# Patient Record
Sex: FEMALE | Race: BLACK OR AFRICAN AMERICAN | URBAN - METROPOLITAN AREA
[De-identification: names, ages, dates, MRNs, and addresses within clinical notes are randomized per-mention and may not be internally consistent; named-entity substitution may affect disease eponyms.]

---

## 2023-06-08 ENCOUNTER — HOSPITAL ENCOUNTER (EMERGENCY)
Age: 23
Discharge: HOME | End: 2023-06-09
Attending: EMERGENCY MEDICINE
Payer: COMMERCIAL

## 2023-06-08 DIAGNOSIS — R03.0 ELEVATED BLOOD PRESSURE READING: ICD-10-CM

## 2023-06-08 DIAGNOSIS — J02.9 SORE THROAT: Primary | ICD-10-CM

## 2023-06-08 PROCEDURE — 87880 STREP A ASSAY W/OPTIC: CPT

## 2023-06-08 PROCEDURE — 87081 CULTURE SCREEN ONLY: CPT

## 2023-06-09 VITALS
SYSTOLIC BLOOD PRESSURE: 148 MMHG | WEIGHT: 293 LBS | TEMPERATURE: 98.8 F | DIASTOLIC BLOOD PRESSURE: 72 MMHG | RESPIRATION RATE: 18 BRPM | OXYGEN SATURATION: 99 % | HEART RATE: 81 BPM

## 2023-06-09 LAB — S PYO AG THROAT QL: NEGATIVE

## 2023-06-09 PROCEDURE — 6360000002 HC RX W HCPCS: Performed by: EMERGENCY MEDICINE

## 2023-06-09 PROCEDURE — 6370000000 HC RX 637 (ALT 250 FOR IP): Performed by: EMERGENCY MEDICINE

## 2023-06-09 RX ORDER — ACETAMINOPHEN 325 MG/1
650 TABLET ORAL ONCE
Status: COMPLETED | OUTPATIENT
Start: 2023-06-09 | End: 2023-06-09

## 2023-06-09 RX ORDER — DEXAMETHASONE 4 MG/1
8 TABLET ORAL ONCE
Status: COMPLETED | OUTPATIENT
Start: 2023-06-09 | End: 2023-06-09

## 2023-06-09 RX ORDER — IBUPROFEN 400 MG/1
400 TABLET ORAL ONCE
Status: COMPLETED | OUTPATIENT
Start: 2023-06-09 | End: 2023-06-09

## 2023-06-09 RX ADMIN — DEXAMETHASONE 8 MG: 4 TABLET ORAL at 00:43

## 2023-06-09 RX ADMIN — IBUPROFEN 400 MG: 400 TABLET, FILM COATED ORAL at 00:43

## 2023-06-09 RX ADMIN — ACETAMINOPHEN 650 MG: 325 TABLET ORAL at 00:43

## 2023-06-09 ASSESSMENT — PAIN SCALES - GENERAL: PAINLEVEL_OUTOF10: 10

## 2023-06-09 ASSESSMENT — PAIN DESCRIPTION - LOCATION: LOCATION: THROAT

## 2023-06-09 NOTE — ED PROVIDER NOTES
use: Never    Drug use: Never     SCREENINGS        [place] Coma Scale  Eye Opening: Spontaneous  Best Verbal Response: Oriented  Best Motor Response: Obeys commands  [place] Coma Scale Score: 15                  CIWA Assessment  BP: (!) 156/79  Pulse: 86         PHYSICAL EXAM  1 or more Elements   INITIAL VITALS: BP: (!) 156/79, Temp: 98.8 °F (37.1 °C), Pulse: 86, Respirations: 18, SpO2: 98 %     Wt Readings from Last 3 Encounters:   06/08/23 (!) 355 lb 9.6 oz (161.3 kg)     Physical Exam  Vitals and nursing note reviewed. Constitutional:       General: She is not in acute distress. Appearance: She is well-developed. She is obese. She is not ill-appearing, toxic-appearing or diaphoretic. HENT:      Head: Normocephalic and atraumatic. Right Ear: External ear normal.      Left Ear: External ear normal.      Nose: No congestion or rhinorrhea. Mouth/Throat:      Lips: Pink. No lesions. Mouth: Mucous membranes are moist.      Tongue: No lesions. Tongue does not deviate from midline. Palate: No mass and lesions. Pharynx: Oropharynx is clear. Uvula midline. No pharyngeal swelling, oropharyngeal exudate, posterior oropharyngeal erythema or uvula swelling. Tonsils: No tonsillar exudate or tonsillar abscesses. 2+ on the right. 2+ on the left. Eyes:      General: No scleral icterus. Conjunctiva/sclera: Conjunctivae normal.   Neck:      Thyroid: No thyromegaly. Trachea: No tracheal deviation. Cardiovascular:      Rate and Rhythm: Normal rate. Pulmonary:      Effort: Pulmonary effort is normal. No respiratory distress. Musculoskeletal:      Cervical back: Normal range of motion. Lymphadenopathy:      Cervical: No cervical adenopathy. Skin:     General: Skin is dry. Capillary Refill: Capillary refill takes less than 2 seconds. Neurological:      General: No focal deficit present. Mental Status: She is alert and oriented to person, place, and time.

## 2023-06-09 NOTE — ED TRIAGE NOTES
Patient presents to ED for c/o being around her niece who had strep throat. Patient c/o tight throat, unable to swallow, and difficulty breathing. Patient states she has taken ibuprofen with no relief.

## 2023-06-09 NOTE — DISCHARGE INSTRUCTIONS
Your rapid strep test was negative. There is a culture pending, if it comes back positive we will call you to start you on antibiotics. In the meantime continue to use ibuprofen and Tylenol for your symptoms at home. You did receive a steroid here which works well for inflammation for 3 days. Make sure you are eating and drinking enough to stay hydrated. Follow-up with your primary care as needed. Return to emergency department for any new or worsening symptoms or concerns.

## 2023-06-09 NOTE — ED NOTES
Provider order placed for patient's discharge. Provider reviewed decision to discharge with the patient. Discharge paperwork and any prescriptions were reviewed with the patient. Patient verbalized understanding of discharge education and any prescriptions and has no further questions or further needs at this time. Patient left with all personal belongings and was stable upon departure. Patient thanked for choosing [facility] ([facility]) and informed to return should any need arise.        Ines Laurent RN  06/09/23 7615

## 2023-06-11 LAB — S PYO THROAT QL CULT: NORMAL

## 2023-11-19 ENCOUNTER — HOSPITAL ENCOUNTER (EMERGENCY)
Age: 23
Discharge: HOME | End: 2023-11-19
Payer: COMMERCIAL

## 2023-11-19 VITALS
RESPIRATION RATE: 20 BRPM | OXYGEN SATURATION: 97 % | DIASTOLIC BLOOD PRESSURE: 100 MMHG | WEIGHT: 293 LBS | HEART RATE: 70 BPM | SYSTOLIC BLOOD PRESSURE: 153 MMHG | TEMPERATURE: 99.5 F | HEIGHT: 65 IN | BODY MASS INDEX: 48.82 KG/M2

## 2023-11-19 DIAGNOSIS — K08.89 ODONTALGIA: Primary | ICD-10-CM

## 2023-11-19 PROCEDURE — 99283 EMERGENCY DEPT VISIT LOW MDM: CPT

## 2023-11-19 RX ORDER — PENICILLIN V POTASSIUM 500 MG/1
500 TABLET ORAL 3 TIMES DAILY
Qty: 30 TABLET | Refills: 0 | Status: SHIPPED | OUTPATIENT
Start: 2023-11-19

## 2023-11-19 RX ORDER — IBUPROFEN 800 MG/1
800 TABLET ORAL EVERY 8 HOURS PRN
Qty: 30 TABLET | Refills: 0 | Status: SHIPPED | OUTPATIENT
Start: 2023-11-19

## 2023-11-19 ASSESSMENT — PAIN DESCRIPTION - PAIN TYPE: TYPE: CHRONIC PAIN

## 2023-11-19 ASSESSMENT — ENCOUNTER SYMPTOMS
ABDOMINAL PAIN: 0
EYE PAIN: 0
VOMITING: 0
SHORTNESS OF BREATH: 0
BACK PAIN: 0
COUGH: 0
SORE THROAT: 0
NAUSEA: 0

## 2023-11-19 ASSESSMENT — PAIN DESCRIPTION - DESCRIPTORS: DESCRIPTORS: ACHING

## 2023-11-19 ASSESSMENT — PAIN SCALES - GENERAL
PAINLEVEL_OUTOF10: 6
PAINLEVEL_OUTOF10: 6

## 2023-11-19 ASSESSMENT — PAIN DESCRIPTION - LOCATION: LOCATION: TEETH

## 2023-11-19 NOTE — ED NOTES
Bilat lower rear dental pain at 8 for 1 week. Has had chronic pain for several months. Last dental visit in summer and referred to  oral surgery for wisdom teeth extraction. No OTC pain meds today.      Cha Russell RN  11/19/23 5177

## 2023-11-19 NOTE — DISCHARGE INSTRUCTIONS
Take prescribed medication as prescribed only  You may  your prescription at the [facility] on [address] your appointment with your oral surgeon    Dental Emergency Referrals    [address] residents only)    [facility] AT [place]  [address] [facility]. [phone]   [address].  [phone]   [facility] AT [place]  (entrance on [address]. [facility]  [address] [facility]  [phone]   [facility]   [address]  [phone]   [facility]  [address].  [phone]       [address] [facility] offering [address] [facility]  [address] [place] [address].  [phone]   [facility].  [phone]     [facility] [place]  [address] 2nd floor  [phone]   [facility]  [address]  [address] [phone] ([address])  [place]: [address]: [address]   [phone] [address] [facility]/ [facility]  [phone]     [facility]  [address] [facility]  [phone]   [address] [facility]  [address]  [address] [facility]  [address] [facility].  Oral Surgery Dept: [phone]  Dental Clinic: [address]  [address]  [phone]     [address]  [address] [phone]   [facility]   [address], [place], [address]  [place] : [address] [facility] [place] : [phone]     [facility]  [address] [facility] [address]    Other [address] in the area    [facility] (Dental Urgent Care)  Crossings of [address]  (Across from [facility])  [address] [facility], [facility]  [phone]

## 2024-05-20 ENCOUNTER — HOSPITAL ENCOUNTER (EMERGENCY)
Age: 24
Discharge: HOME | End: 2024-05-20
Attending: EMERGENCY MEDICINE
Payer: COMMERCIAL

## 2024-05-20 VITALS
HEIGHT: 68 IN | BODY MASS INDEX: 44.41 KG/M2 | SYSTOLIC BLOOD PRESSURE: 161 MMHG | RESPIRATION RATE: 16 BRPM | HEART RATE: 88 BPM | OXYGEN SATURATION: 100 % | WEIGHT: 293 LBS | DIASTOLIC BLOOD PRESSURE: 94 MMHG | TEMPERATURE: 98.9 F

## 2024-05-20 DIAGNOSIS — G51.0 BELL'S PALSY: Primary | ICD-10-CM

## 2024-05-20 PROCEDURE — 6370000000 HC RX 637 (ALT 250 FOR IP): Performed by: EMERGENCY MEDICINE

## 2024-05-20 PROCEDURE — 99283 EMERGENCY DEPT VISIT LOW MDM: CPT

## 2024-05-20 RX ORDER — ACYCLOVIR 200 MG/1
800 CAPSULE ORAL ONCE
Status: COMPLETED | OUTPATIENT
Start: 2024-05-20 | End: 2024-05-20

## 2024-05-20 RX ORDER — VALACYCLOVIR HYDROCHLORIDE 1 G/1
1000 TABLET, FILM COATED ORAL 3 TIMES DAILY
Qty: 21 TABLET | Refills: 0 | Status: SHIPPED | OUTPATIENT
Start: 2024-05-20 | End: 2024-05-27

## 2024-05-20 RX ORDER — TETRAHYDROZOLINE HCL 0.05 %
1 DROPS OPHTHALMIC (EYE) PRN
Qty: 15 ML | Refills: 0 | Status: SHIPPED | OUTPATIENT
Start: 2024-05-20

## 2024-05-20 RX ORDER — PREDNISONE 10 MG/1
60 TABLET ORAL DAILY
Qty: 42 TABLET | Refills: 0 | Status: SHIPPED | OUTPATIENT
Start: 2024-05-20 | End: 2024-05-27

## 2024-05-20 RX ORDER — PREDNISONE 20 MG/1
60 TABLET ORAL ONCE
Status: COMPLETED | OUTPATIENT
Start: 2024-05-20 | End: 2024-05-20

## 2024-05-20 RX ADMIN — PREDNISONE 60 MG: 20 TABLET ORAL at 19:25

## 2024-05-20 RX ADMIN — ACYCLOVIR 800 MG: 200 CAPSULE ORAL at 19:26

## 2024-05-20 NOTE — DISCHARGE INSTRUCTIONS
Call today or tomorrow to follow up with Sim Resendez APRN - CNP  in 3-4 days.    Use either the eye medication prescribed as indicated or apply Vaseline to your eye every 2 hours to keep it moist.  You can also use Visine eye drops every hour to keep your eye moist. Put scotch tape over your left eyelid to keep it closed at night while you sleep.     Return to the emergency department for worsening of pain, blurry vision or inability to see, swelling to eye, drainage from eye, the white of your eye turns red, headache, any change in your strength in your arms or legs, any other care or concern.

## 2024-05-20 NOTE — ED PROVIDER NOTES
EMERGENCY DEPARTMENT ENCOUNTER     [facility]     Pt Name: Jaren Ortiz   MRN: [id number]   Birthdate 2000   Date of evaluation: 5/20/2024   Provider: Saeed Hernandez MD   PCP: Sim Resendez APRN - CNP   Note Started: 7:07 PM EDT 5/20/24     CHIEF COMPLAINT     Chief Complaint   Patient presents with    Numbness     In her face, breathing WNL.         HISTORY OF PRESENT ILLNESS:  History from : Patient and mother  Limitations to history : None     Jaren Ortiz is a 23 y.o. female who presents to the emergency room with her mother via private vehicle for evaluation of numbness and decreased movement on the right side of the face that she noticed started this morning.  Patient states she woke up and noticed that she was having difficulty closing her right eye completely as well as some numbness on the right side of her tongue.  Patient denies any pain on the right side of the face.  Patient denies any numbness or weakness in the extremities.  No vision changes.  Denies any fevers or chills.  No difficulty swallowing.  No nausea or vomiting.  No chest pain, shortness of breath or cough.  Has a past medical history of allergies and asthma.    Nursing Notes were all reviewed and agreed with or any disagreements were addressed in the HPI.     ROS: Positives and Pertinent negatives as per HPI.    PAST MEDICAL HISTORY     Past medical history:  has a past medical history of Asthma and Seasonal allergies.    Past surgical history: Denies any past surgical history      PHYSICAL EXAM:  ED Triage Vitals [05/20/24 1843]   BP Temp Temp Source Pulse Respirations SpO2 Height Weight - Scale   (!) 164/92 98.9 °F (37.2 °C) Oral 100 18 93 % 1.727 m (5' 8\") (!) 176.8 kg (389 lb 12.4 oz)        CONSTITUTIONAL: AOx4, NAD, cooperative with exam, afebrile   HEAD: normocephalic, atraumatic   EYES: PERRL, EOMI, anicteric sclera   ENT: Moist mucous membranes, uvula midline, no swelling of the

## 2024-05-20 NOTE — ED NOTES
Patient DCed from ED at this time. Discussed AVS, follow up, and scripts. They verbalized understanding. Reinforced that should symptoms persist or worsen to return to the ED. They verbalized understanding. Patient ambulated out of ED. RN thanked patient for choosing [facility].